# Patient Record
(demographics unavailable — no encounter records)

---

## 2025-02-06 NOTE — HISTORY OF PRESENT ILLNESS
[FreeTextEntry1] : 45 year old man with history of obesity is here in the sleep center to address excessive snoring and restless sleep.  Patient is sleepy with [place] sleepiness score of 14.  Patient has very loud snoring which disturbs other people, also has witnessed apneas.  Patient's bedtime is around 1 AM wakes up in the morning around 5 AM.  His sleep is very disturbed. He feels tired when he wakes up.  Patients work schedule probably started his poor sleep patterns, his shifts are 16 hrs long. Currently he is on workmans comp due to work related injury. He is not able to improve sleep schedule due to underlying sleep apnea symptoms and long standing poor sleep schedules secondary to work.  Patient does not drink coffee regularly. Patient does not have any headaches or nocturia. He is sleepy while driving sometimes. STOPBANG score - 6 neck size - 21 inches

## 2025-02-06 NOTE — ASSESSMENT
[FreeTextEntry1] : Assessment:  Excessive daytime sleepiness likely related to disturbed sleep secondary to suspected obstructive sleep apnea (CALEB). Obesity as a contributing factor to potential CALEB. Disrupted sleep schedule secondary to long work shifts and work-related injury. Plan:  Recommend overnight polysomnography (sleep study) to evaluate for obstructive sleep apnea (CALEB) given the high STOP-BANG score, loud snoring, witnessed apneas, and the patient's increased neck size. Ordered split study. Discuss potential treatment options for CALEB, including continuous positive airway pressure (CPAP) therapy if sleep apnea is confirmed. Encourage the patient to address his sleep hygiene as much as possible within the constraints of his work schedule, focusing on consistent bedtime and wake time, even on days off. Follow up with the patient after the sleep study to review results and initiate appropriate treatment if needed. Monitor and provide support for his workers' compensation case and its impact on his health and sleep. He is on zepbound to help lose weight, he has lost about 35 pounds so far.

## 2025-02-06 NOTE — REASON FOR VISIT
[Initial Evaluation] : an initial evaluation [Sleep Apnea] : sleep apnea [TextEntry] :  This visit is done virtually using both audio and video as per patients request. Patient understood limitations of tele visit and agrees to move forward. patients location - [address], [place] doctors location - [address], [place] identification verified with patients name and date of birth.